# Patient Record
(demographics unavailable — no encounter records)

---

## 2024-12-03 NOTE — DISCUSSION/SUMMARY
[de-identified] : GIOVANNY RINALDI  is an 22 year-old male who presents to the clinic with 1 weeks of right knee pain.  The symptoms began after a traumatic twisting injury. The patient initially tried OTC medications/NSAIDs with some relief, even though short lasting. Exercise therapy has had only temporary relief.  Radiographic findings show no obvious fracture or deformity,however patient has had continued pain and is unable to return to their usual activities. Given the history along with the physical exam findings of lateral joint line pain and pain with deep squat, I am concerned about a possible meniscus tear. I discussed that radiographs show the bones well but do not show the soft tissues, such as ligaments, tendons and menisci well. At this point, the patient is quite debilitated by his  pain and is unable to return to his  activities of daily living such as squatting. Given the persistent symptoms, I discussed with the patient that his should continue to avoid strenuous activities while we obtain an MRI to further evaluate for an internal derangement of the knee. The office will work to obtain the MRI.   I discussed with them that I often prescribe an anti-inflammatory that should be taken once a day with meals to decrease pain and expedite symptom relief. They should not take this while also taking Aleve (Naprosyn), Motrin/ Advil (Ibuprofen), Toradol (ketoralac). They must stop taking it if they develops stomach pain, increased bleeding or bruising and they should follow-up with their primary care doctor for routine blood work including kidney function to monitor its effect. While it Is not a habit-forming substance, it should only  be taken as needed and to discontinue use once symptoms have resolved.   In the interim, the patient should continue to walk and take anti-inflammatory medications as directed if not medically contraindicated. They will schedule follow-up for in person review of the MRI results. They know to call the office or present to the ER if they develop worsening pain, swelling, numbness, tingling, inability to use the leg.   My cumulative time spent on this patients visit included: Preparation for the visit, review of the medical records, review of pertinent diagnostic studies, examination and counseling of the patient on the above diagnosis, treatment plan and prognosis, orders of diagnostic tests, medications and/or appropriate procedures and documentation in the medical records of todays visit.

## 2024-12-03 NOTE — DISCUSSION/SUMMARY
[de-identified] : GIOVANNY RINALDI  is an 22 year-old male who presents to the clinic with 1 weeks of right knee pain.  The symptoms began after a traumatic twisting injury. The patient initially tried OTC medications/NSAIDs with some relief, even though short lasting. Exercise therapy has had only temporary relief.  Radiographic findings show no obvious fracture or deformity,however patient has had continued pain and is unable to return to their usual activities. Given the history along with the physical exam findings of lateral joint line pain and pain with deep squat, I am concerned about a possible meniscus tear. I discussed that radiographs show the bones well but do not show the soft tissues, such as ligaments, tendons and menisci well. At this point, the patient is quite debilitated by his  pain and is unable to return to his  activities of daily living such as squatting. Given the persistent symptoms, I discussed with the patient that his should continue to avoid strenuous activities while we obtain an MRI to further evaluate for an internal derangement of the knee. The office will work to obtain the MRI.   I discussed with them that I often prescribe an anti-inflammatory that should be taken once a day with meals to decrease pain and expedite symptom relief. They should not take this while also taking Aleve (Naprosyn), Motrin/ Advil (Ibuprofen), Toradol (ketoralac). They must stop taking it if they develops stomach pain, increased bleeding or bruising and they should follow-up with their primary care doctor for routine blood work including kidney function to monitor its effect. While it Is not a habit-forming substance, it should only  be taken as needed and to discontinue use once symptoms have resolved.   In the interim, the patient should continue to walk and take anti-inflammatory medications as directed if not medically contraindicated. They will schedule follow-up for in person review of the MRI results. They know to call the office or present to the ER if they develop worsening pain, swelling, numbness, tingling, inability to use the leg.   My cumulative time spent on this patients visit included: Preparation for the visit, review of the medical records, review of pertinent diagnostic studies, examination and counseling of the patient on the above diagnosis, treatment plan and prognosis, orders of diagnostic tests, medications and/or appropriate procedures and documentation in the medical records of todays visit.

## 2024-12-03 NOTE — HISTORY OF PRESENT ILLNESS
[de-identified] : Patient presents for evaluation of right knee pain that began after playing basketball.  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  He reports that he has been experiencing lateral and posterior knee pain worse with squatting and walking.  Pain is 7 out of 10 in severity

## 2024-12-03 NOTE — PHYSICAL EXAM
[de-identified] : General Appearance / Station: Well developed, well nourished, in no acute distress  Orientation: Oriented to person, place, and time Gait & Station: Ambulates without assistive device Neurologic: Normal leg sensation  Cardiovascular: Warm extremity  Lymphatics: No lymphedema  Generalized Ligament Laxity: Normal  Stiffness: Normal   LUMBAR SPINE: Nontender  at lumbar spine Straight leg raise: Negative  Motor: 5/5 motor L2-S1 Sensation: Intact  L2-S1  RIGHT HIP: Range of motion: Painless  internal and external rotation of the hip. Strength: Within Normal Limits  Palpation: Nontender  at greater trochanter. Nontender  at SI joint Stinchfield: Negative  FADIR: Negative  CHANTAL: Negative   SYMPTOMATIC RIGHT KNEE: Alignment: Neutral Skin: normal Effusion: none . Quadriceps: normal . Range of motion: symmetrical but painful . PF crepitus: 1+. PF apprehension: none . Patella / Patella Tendon: nontender . Lachman's: negative  Valgus @ 30: negative. Varus @ 30: negative. Posterior drawer: negative. Palpation: TENDER AT LATERAL JOINT LINE. Meniscus signs: Lateral joint line pain pain with deep squat  ASYMPTOMATIC LEFT KNEE: Alignment: Neutral Skin: normal Effusion: none . Quadriceps: normal . Range of motion: symmetrical . PF crepitus: none . PF apprehension: none . Patella / Patella Tendon: nontender . Lachman's: negative  Valgus @ 30: negative. Varus @ 30: negative. Posterior drawer: negative. Palpation: nontender. Meniscus signs: negative . [de-identified] : Imaging: AP Pelvis show no significant hip joint space narrowing. There are no signs of fracture. The soft tissues appear unremarkable  Imaging: Standing 4 views of the right knee show right knee no significant joint space narrowing. There are no signs of fracture. There is no [] knee effusion. The soft tissues appear unremarkable

## 2024-12-03 NOTE — PHYSICAL EXAM
[de-identified] : General Appearance / Station: Well developed, well nourished, in no acute distress  Orientation: Oriented to person, place, and time Gait & Station: Ambulates without assistive device Neurologic: Normal leg sensation  Cardiovascular: Warm extremity  Lymphatics: No lymphedema  Generalized Ligament Laxity: Normal  Stiffness: Normal   LUMBAR SPINE: Nontender  at lumbar spine Straight leg raise: Negative  Motor: 5/5 motor L2-S1 Sensation: Intact  L2-S1  RIGHT HIP: Range of motion: Painless  internal and external rotation of the hip. Strength: Within Normal Limits  Palpation: Nontender  at greater trochanter. Nontender  at SI joint Stinchfield: Negative  FADIR: Negative  CHANTAL: Negative   SYMPTOMATIC RIGHT KNEE: Alignment: Neutral Skin: normal Effusion: none . Quadriceps: normal . Range of motion: symmetrical but painful . PF crepitus: 1+. PF apprehension: none . Patella / Patella Tendon: nontender . Lachman's: negative  Valgus @ 30: negative. Varus @ 30: negative. Posterior drawer: negative. Palpation: TENDER AT LATERAL JOINT LINE. Meniscus signs: Lateral joint line pain pain with deep squat  ASYMPTOMATIC LEFT KNEE: Alignment: Neutral Skin: normal Effusion: none . Quadriceps: normal . Range of motion: symmetrical . PF crepitus: none . PF apprehension: none . Patella / Patella Tendon: nontender . Lachman's: negative  Valgus @ 30: negative. Varus @ 30: negative. Posterior drawer: negative. Palpation: nontender. Meniscus signs: negative . [de-identified] : Imaging: AP Pelvis show no significant hip joint space narrowing. There are no signs of fracture. The soft tissues appear unremarkable  Imaging: Standing 4 views of the right knee show right knee no significant joint space narrowing. There are no signs of fracture. There is no [] knee effusion. The soft tissues appear unremarkable